# Patient Record
Sex: FEMALE | Race: WHITE
[De-identification: names, ages, dates, MRNs, and addresses within clinical notes are randomized per-mention and may not be internally consistent; named-entity substitution may affect disease eponyms.]

---

## 2018-02-28 ENCOUNTER — HOSPITAL ENCOUNTER (OUTPATIENT)
Age: 53
End: 2018-02-28
Payer: MEDICARE

## 2018-02-28 DIAGNOSIS — M25.562: Primary | ICD-10-CM

## 2018-02-28 DIAGNOSIS — Z98.1: ICD-10-CM

## 2018-02-28 DIAGNOSIS — M25.561: ICD-10-CM

## 2018-02-28 PROCEDURE — 73564 X-RAY EXAM KNEE 4 OR MORE: CPT

## 2018-06-11 ENCOUNTER — HOSPITAL ENCOUNTER (OUTPATIENT)
Age: 53
End: 2018-06-11
Payer: MEDICARE

## 2018-06-11 DIAGNOSIS — Z12.31: Primary | ICD-10-CM

## 2018-06-11 PROCEDURE — 77067 SCR MAMMO BI INCL CAD: CPT

## 2018-12-11 ENCOUNTER — HOSPITAL ENCOUNTER (OUTPATIENT)
Dept: HOSPITAL 22 - PT | Age: 53
Discharge: HOME | End: 2018-12-11
Payer: MEDICARE

## 2018-12-11 DIAGNOSIS — M25.561: Primary | ICD-10-CM

## 2018-12-11 PROCEDURE — 97163 PT EVAL HIGH COMPLEX 45 MIN: CPT

## 2018-12-11 PROCEDURE — 97014 ELECTRIC STIMULATION THERAPY: CPT

## 2018-12-11 PROCEDURE — G0283 ELEC STIM OTHER THAN WOUND: HCPCS

## 2018-12-11 PROCEDURE — 97016 VASOPNEUMATIC DEVICE THERAPY: CPT

## 2018-12-11 PROCEDURE — 97110 THERAPEUTIC EXERCISES: CPT

## 2018-12-11 PROCEDURE — 97010 HOT OR COLD PACKS THERAPY: CPT

## 2019-04-29 ENCOUNTER — OFFICE VISIT (OUTPATIENT)
Dept: ORTHOPEDIC SURGERY | Facility: CLINIC | Age: 54
End: 2019-04-29

## 2019-04-29 VITALS — HEART RATE: 127 BPM | WEIGHT: 200.62 LBS | BODY MASS INDEX: 34.25 KG/M2 | OXYGEN SATURATION: 98 % | HEIGHT: 64 IN

## 2019-04-29 DIAGNOSIS — M75.91 SUPRASPINATUS TENDINITIS, RIGHT: ICD-10-CM

## 2019-04-29 DIAGNOSIS — M25.511 RIGHT SHOULDER PAIN, UNSPECIFIED CHRONICITY: Primary | ICD-10-CM

## 2019-04-29 PROCEDURE — 99204 OFFICE O/P NEW MOD 45 MIN: CPT | Performed by: ORTHOPAEDIC SURGERY

## 2019-04-29 RX ORDER — OXYCODONE HYDROCHLORIDE AND ACETAMINOPHEN 5; 325 MG/1; MG/1
1 TABLET ORAL 3 TIMES DAILY
COMMUNITY

## 2019-04-29 RX ORDER — MELOXICAM 7.5 MG/1
7.5 TABLET ORAL 2 TIMES DAILY
COMMUNITY

## 2019-04-29 RX ORDER — TIZANIDINE 4 MG/1
4 TABLET ORAL 3 TIMES DAILY
COMMUNITY

## 2019-04-29 RX ORDER — GABAPENTIN 600 MG/1
600 TABLET ORAL 3 TIMES DAILY
COMMUNITY

## 2019-04-29 RX ORDER — LEVOTHYROXINE SODIUM 0.05 MG/1
50 TABLET ORAL DAILY
COMMUNITY

## 2019-04-29 NOTE — PROGRESS NOTES
[facility] [place] [facility] Note    Subjective     Chief Complaint   Patient presents with   • Right Shoulder - Pain        HPI      Davida BRUCE is a 53 y.o. female.  Pain is worse than right shoulder pain.  She had surgery 4 years ago.  She had a rotator cuff repair.  She is on disability.  She takes Percocet for back and shoulder.  Pain ranges from 4-8 out of 10.  It is aching burning throbbing.  She has stiffness and weakness.        Past Medical History:   Diagnosis Date   • Arthritis of back    • Knee swelling    • Periarthritis of shoulder    • Thyroid trouble       Past Surgical History:   Procedure Laterality Date   • BACK SURGERY  2001    L4-L5-S1   • CHOLECYSTECTOMY     • OOPHORECTOMY Right    • SHOULDER SURGERY Right     MAKEDA Damon   • TUBAL ABDOMINAL LIGATION        Family History   Problem Relation Age of Onset   • Osteoarthritis Mother    • Heart attack Father      Social History     Socioeconomic History   • Marital status:      Spouse name: Not on file   • Number of children: Not on file   • Years of education: Not on file   • Highest education level: Not on file   Tobacco Use   • Smoking status: Current Every Day Smoker     Packs/day: 1.00     Types: Cigarettes     Start date: 1985   • Smokeless tobacco: Never Used   Substance and Sexual Activity   • Alcohol use: No     Frequency: Never   • Drug use: No   • Sexual activity: Defer      Current Outpatient Medications on File Prior to Visit   Medication Sig Dispense Refill   • diphenhydrAMINE HCl (ALLERGY MED PO) Take  by mouth Daily.     • gabapentin (NEURONTIN) 600 MG tablet Take 600 mg by mouth 3 (Three) Times a Day.     • levothyroxine (SYNTHROID, LEVOTHROID) 50 MCG tablet Take 50 mcg by mouth Daily.     • meloxicam (MOBIC) 7.5 MG tablet Take 7.5 mg by mouth 2 (Two) Times a Day.     • oxyCODONE-acetaminophen (PERCOCET) 5-325 MG per tablet Take 1 tablet by mouth 3 (Three) Times a Day.     • tiZANidine (ZANAFLEX) 4 MG tablet Take 4  "mg by mouth 3 (Three) Times a Day.       No current facility-administered medications on file prior to visit.       Allergies   Allergen Reactions   • Penicillins Anaphylaxis        The following portions of the patient's history were reviewed and updated as appropriate: allergies, current medications, past family history, past medical history, past social history, past surgical history and problem list.    Review of Systems   Constitutional: Positive for activity change.        Night sweats   HENT: Negative.    Eyes: Negative.    Respiratory: Negative.    Cardiovascular: Negative.    Gastrointestinal: Negative.    Endocrine: Negative.    Genitourinary: Negative.    Musculoskeletal: Positive for arthralgias.   Skin: Negative.    Allergic/Immunologic: Positive for food allergies.   Neurological: Negative.    Hematological: Negative.    Psychiatric/Behavioral: Negative.         Objective      Physical Exam  Pulse (!) 127   Ht 162.6 cm (64\")   Wt 91 kg (200 lb 9.9 oz)   SpO2 98%   BMI 34.44 kg/m²     Body mass index is 34.44 kg/m².        GENERAL APPEARANCE: awake, alert & oriented x 3, in no acute distress and well developed, well nourished  PSYCH: normal mood and affect  LUNGS:  breathing nonlabored, no wheezing  EYES: sclera anicteric, pupils equal  CARDIOVASCULAR: palpable pulses dorsalis pedis, palpable posterior tibial bilaterally. Capillary refill less than 2 seconds  INTEGUMENTARY: skin intact, no clubbing, cyanosis  NEUROLOGIC:  Normal Sensation and reflexes             Ortho Exam  Musculoskeletal   Upper Extremity   Right Shoulder     Inspection and Palpation:     Tenderness - none    Crepitus - none    Sensation is normal    Examination reveals no ecchymosis.      Strength and Tone:    Supraspinatus,  Infraspinatus - 4/5    Subscapularis - 5/5    Deltoid - 5/5     Range of Motion   Left shoulder:    Internal Rotation: ROM - T7    External Rotation: AROM - 80 degrees    Elevation through flexion: AROM - " 180 degrees    Right Shoulder:    Internal Rotation: ROM - T7    External Rotation: AROM - 80 degrees    Elevation through flexion: AROM - 100 degrees     Abduction -100 degrees     Instability   Right shoulder    Sulcus sign negative    Apprehension test negative    Dangelo relocation test negative    Jerk test negative   Impingement   Right shoulder    Gill impingement test positive    Neer impingement test positive   Functional Testing   Right shoulder    AC crossover adduction test negative    Abdominal compression test negative    Lift-off sign negative    Speed's test negative    Deluna's test negative    Horriblower's sign negative       Imaging/Studies  Imaging Results (last 7 days)     Procedure Component Value Units Date/Time    XR Shoulder 2+ View Right [[id number]] Resulted:  04/29/19 1420     Updated:  04/29/19 1420    Narrative:       Right Shoulder X-Ray  Indication: Pain  AP, scapular Y, and axillary lateral views    Findings:  No fracture  No bony lesion  Normal soft tissues  Normal joint spaces    No prior studies were available for comparison.            Assessment/Plan        ICD-10-CM ICD-9-CM   1. Right shoulder pain, unspecified chronicity M25.511 719.41   2. Supraspinatus tendinitis, right M75.91 726.10       Orders Placed This Encounter   Procedures   • XR Shoulder 2+ View Right   • MRI Shoulder Right Without Contrast      I have ordered an MRI of the right shoulder.  She will continue the Mobic for the shoulder.  I will see her back after the MRI.  I am ordering an MRI to rule out a rotator cuff tear  Medical Decision Making  Management Options : over-the-counter medicine and prescription/IM medicine  Data/Risk: radiology tests and independent visualization of imaging, lab tests, or EMG/NCV    Marc Damon MD  04/29/19  2:25 PM         EMR Dragon/Transcription disclaimer:  Much of this encounter note is an electronic transcription of spoken language to printed text. Electronic  transcription of spoken language may permit erroneous, or at times, nonsensical words or phrases to be inadvertently transcribed. Although I have reviewed the note for such errors, some may still exist.

## 2019-05-13 ENCOUNTER — HOSPITAL ENCOUNTER (OUTPATIENT)
Age: 54
End: 2019-05-13
Payer: MEDICARE

## 2019-05-13 DIAGNOSIS — M75.91: ICD-10-CM

## 2019-05-13 DIAGNOSIS — M25.511: Primary | ICD-10-CM

## 2019-05-13 PROCEDURE — 73221 MRI JOINT UPR EXTREM W/O DYE: CPT

## 2019-05-15 ENCOUNTER — OFFICE VISIT (OUTPATIENT)
Dept: ORTHOPEDIC SURGERY | Facility: CLINIC | Age: 54
End: 2019-05-15

## 2019-05-15 VITALS — WEIGHT: 200.62 LBS | HEART RATE: 97 BPM | BODY MASS INDEX: 34.25 KG/M2 | OXYGEN SATURATION: 99 % | HEIGHT: 64 IN

## 2019-05-15 DIAGNOSIS — M19.011 PRIMARY LOCALIZED OSTEOARTHROSIS OF RIGHT SHOULDER REGION: ICD-10-CM

## 2019-05-15 DIAGNOSIS — F17.200 SMOKER UNMOTIVATED TO QUIT: ICD-10-CM

## 2019-05-15 DIAGNOSIS — S43.431D SUPERIOR GLENOID LABRUM LESION OF RIGHT SHOULDER, SUBSEQUENT ENCOUNTER: ICD-10-CM

## 2019-05-15 DIAGNOSIS — S46.011D RUPTURE OF RIGHT SUPRASPINATUS TENDON, SUBSEQUENT ENCOUNTER: Primary | ICD-10-CM

## 2019-05-15 PROCEDURE — 99214 OFFICE O/P EST MOD 30 MIN: CPT | Performed by: ORTHOPAEDIC SURGERY

## 2019-05-15 PROCEDURE — 99406 BEHAV CHNG SMOKING 3-10 MIN: CPT | Performed by: ORTHOPAEDIC SURGERY

## 2019-05-15 NOTE — PROGRESS NOTES
[facility] [place] [facility]opa[facility] Note    Subjective     Chief Complaint   Patient presents with   • Follow-up     Post MRI - Right shoulder pain, unspecified chronicity        HPI      Davida BRUCE is a 53 y.o. female.  She had a rotator cuff repair about 4 years ago and was doing well until 3 to 4 months ago and started to have increasing pain.  She is here for follow-up of the MRI.  The pain is 7 out of 10 it is aching burning stabbing shooting.  She has pain popping stiffness and giving way.  She is on chronic disability for her back.  She has not worked since .  She is a smoker unmotivated to quit.      Past Medical History:   Diagnosis Date   • Arthritis of back    • Knee swelling    • Periarthritis of shoulder    • Thyroid trouble       Past Surgical History:   Procedure Laterality Date   • BACK SURGERY      L4-L5-S1   • CHOLECYSTECTOMY     • OOPHORECTOMY Right    • SHOULDER SURGERY Right     MAKEDA Damon   • TUBAL ABDOMINAL LIGATION        Family History   Problem Relation Age of Onset   • Osteoarthritis Mother    • Heart attack Father      Social History     Socioeconomic History   • Marital status:      Spouse name: Not on file   • Number of children: Not on file   • Years of education: Not on file   • Highest education level: Not on file   Tobacco Use   • Smoking status: Former Smoker     Packs/day: 1.00     Types: Cigarettes     Start date:      Last attempt to quit: 2019     Years since quittin.0   • Smokeless tobacco: Never Used   Substance and Sexual Activity   • Alcohol use: No     Frequency: Never   • Drug use: No   • Sexual activity: Defer      Current Outpatient Medications on File Prior to Visit   Medication Sig Dispense Refill   • diphenhydrAMINE HCl (ALLERGY MED PO) Take  by mouth Daily.     • gabapentin (NEURONTIN) 600 MG tablet Take 600 mg by mouth 3 (Three) Times a Day.     • levothyroxine (SYNTHROID, LEVOTHROID) 50 MCG tablet Take 50 mcg by mouth Daily.     •  "meloxicam (MOBIC) 7.5 MG tablet Take 7.5 mg by mouth 2 (Two) Times a Day.     • oxyCODONE-acetaminophen (PERCOCET) 5-325 MG per tablet Take 1 tablet by mouth 3 (Three) Times a Day.     • tiZANidine (ZANAFLEX) 4 MG tablet Take 4 mg by mouth 3 (Three) Times a Day.       No current facility-administered medications on file prior to visit.       Allergies   Allergen Reactions   • Penicillins Anaphylaxis        The following portions of the patient's history were reviewed and updated as appropriate: allergies, current medications, past family history, past medical history, past social history, past surgical history and problem list.    Review of Systems   Constitutional: Negative.    HENT: Negative.    Eyes: Negative.    Respiratory: Negative.    Cardiovascular: Negative.    Gastrointestinal: Negative.    Endocrine: Negative.    Genitourinary: Positive for frequency.   Musculoskeletal: Positive for joint swelling.   Skin: Negative.    Allergic/Immunologic: Positive for environmental allergies and food allergies.   Neurological: Negative.    Hematological: Negative.    Psychiatric/Behavioral: Negative.         Objective      Physical Exam  Pulse 97   Ht 162.6 cm (64.02\")   Wt 91 kg (200 lb 9.9 oz)   SpO2 99%   Breastfeeding? No   BMI 34.42 kg/m²     Body mass index is 34.42 kg/m².        GENERAL APPEARANCE: awake, alert & oriented x 3, in no acute distress and well developed, well nourished  PSYCH: normal mood and affect      Ortho Exam  Musculoskeletal   Upper Extremity   Right Shoulder     Inspection and Palpation:     Tenderness - none    Crepitus - none    Sensation is normal    Examination reveals no ecchymosis.      Strength and Tone:    Supraspinatus,  Infraspinatus - 4/5    Subscapularis - 5/5    Deltoid - 5/5     Range of Motion   Left shoulder:    Internal Rotation: ROM - T7    External Rotation: AROM - 80 degrees    Elevation through flexion: AROM - 180 degrees    Right Shoulder:    Internal Rotation: ROM " - T7    External Rotation: AROM - 80 degrees    Elevation through flexion: AROM - 90 degrees     Abduction -90 degrees     Instability   Right shoulder    Sulcus sign negative    Apprehension test negative    Dangelo relocation test negative    Jerk test negative   Impingement   Right shoulder    Gill impingement test positive    Neer impingement test positive   Functional Testing   Right shoulder    AC crossover adduction test negative    Abdominal compression test negative    Lift-off sign negative    Speed's test negative    Deluna's test negative    Horriblower's sign negative       Imaging/Studies  Imaging Results (last 7 days)     ** No results found for the last 168 hours. **      I viewed her MRI from May 13 which shows a massive retracted rotator cuff tear with humeral head elevation and SLAP tear with some arthritic changes    Assessment/Plan        ICD-10-CM ICD-9-CM   1. Rupture of right supraspinatus tendon, subsequent encounter S46.011D V58.89     840.6   2. Superior glenoid labrum lesion of right shoulder, subsequent encounter S43.431D V58.89     840.7   3. Smoker unmotivated to quit F17.200 305.1   4. Primary localized osteoarthrosis of right shoulder region M19.011 715.11     She has a massive retracted cuff tear.  The plan will be for right shoulder arthroscopy with attempted revision rotator cuff repair.  The labrum will be addressed at surgery with a possible biceps tenodesis.  The arthritis will be treated nonoperatively with anti-inflammatories.  Based upon her smoking she has a high failure rate.  I counseled her to stop smoking. I spent 3 minutes with the patient discussing smoking cessation. I spent approximately 3 minutes counseling the patient regarding the adverse effects of tobacco use.  Included in this counseling session were treatment options for cessation including quitting cold turkey, medication, nicotine step-down programs, and smoking cessation programs.  Furthermore, I explained  to the patient the importance of abstaining from nicotine usage as nicotine is known to increase the risk of complications associated with surgery including but not limited to infection, decreased wound healing, slowed soft tissue healing, and increased surgery associated pain. I explained the risk of smoking, including hardening of the arteries,  gangrene, amputation.  I explained smoking poisons bone cells and increases the risk of nonunion of fractures and fusions.  I explained that smokers are also an increased risk of tendinitis and it is more difficult to treat and resolve. Treatment options and alternatives were discussed with patient.  Surgical risks include but are not limited to pain, bleeding, infection, failure to relieve symptoms, need for further procedures, recurrence of symptoms, damage to healthy adjacent structures, hardware loosening/failure, stiffness, weakness, scar, blood clots/DVT/PE, loss of limb or life. We also discussed the postoperative protocol and expected outcome although no guarantees are possible with surgery. All questions were answered; the patient would like to proceed with surgical intervention.  Medical Decision Making  Management Options : over-the-counter medicine and major surgery with risk factors  Data/Risk: radiology tests and independent visualization of imaging, lab tests, or EMG/NCV    Marc Damon MD  05/15/19  2:16 PM         EMR Dragon/Transcription disclaimer:  Much of this encounter note is an electronic transcription of spoken language to printed text. Electronic transcription of spoken language may permit erroneous, or at times, nonsensical words or phrases to be inadvertently transcribed. Although I have reviewed the note for such errors, some may still exist.

## 2019-05-20 DIAGNOSIS — M25.511 RIGHT SHOULDER PAIN, UNSPECIFIED CHRONICITY: ICD-10-CM

## 2019-05-20 DIAGNOSIS — M75.91 SUPRASPINATUS TENDINITIS, RIGHT: ICD-10-CM

## 2019-05-31 ENCOUNTER — TELEPHONE (OUTPATIENT)
Dept: ORTHOPEDIC SURGERY | Facility: CLINIC | Age: 54
End: 2019-05-31

## 2019-05-31 NOTE — TELEPHONE ENCOUNTER
RETURNED PATIENT'S CALL REGARDING MEDICATIONS PRIOR TO SURGERY WITH DR JIMENES.  SHE DID NOT ANSWER, I LEFT MESSAGE TO RETURN MY CALL.

## 2019-06-04 ENCOUNTER — OUTSIDE FACILITY SERVICE (OUTPATIENT)
Dept: ORTHOPEDIC SURGERY | Facility: CLINIC | Age: 54
End: 2019-06-04

## 2019-06-04 PROCEDURE — 29827 SHO ARTHRS SRG RT8TR CUF RPR: CPT | Performed by: ORTHOPAEDIC SURGERY

## 2019-06-04 PROCEDURE — 29826 SHO ARTHRS SRG DECOMPRESSION: CPT | Performed by: ORTHOPAEDIC SURGERY

## 2019-06-10 ENCOUNTER — OFFICE VISIT (OUTPATIENT)
Dept: ORTHOPEDIC SURGERY | Facility: CLINIC | Age: 54
End: 2019-06-10

## 2019-06-10 DIAGNOSIS — Z98.890 STATUS POST RIGHT ROTATOR CUFF REPAIR: Primary | ICD-10-CM

## 2019-06-10 PROCEDURE — 99024 POSTOP FOLLOW-UP VISIT: CPT | Performed by: ORTHOPAEDIC SURGERY

## 2019-06-10 RX ORDER — PANTOPRAZOLE SODIUM 40 MG/1
TABLET, DELAYED RELEASE ORAL
COMMUNITY
Start: 2019-05-13

## 2019-06-10 NOTE — PROGRESS NOTES
Chief Complaint   Patient presents with   • Right Shoulder - Follow-up, Post-op     Status post right shoulder arthroscopy with rotator cuff repair 6/4/19           HPI    She is doing well 1 week status post right shoulder cuff repair.  Pain is 0.  She is in a sling.    There were no vitals filed for this visit.      Physical Exam:    Wounds look good.  Neurovascular intact.        ICD-10-CM ICD-9-CM   1. Status post right rotator cuff repair Z98.890 V45.89     She will follow-up in 3 weeks to start physical therapy.  She will continue the sling.

## 2019-07-01 ENCOUNTER — OFFICE VISIT (OUTPATIENT)
Dept: ORTHOPEDIC SURGERY | Facility: CLINIC | Age: 54
End: 2019-07-01

## 2019-07-01 DIAGNOSIS — F17.200 SMOKER UNMOTIVATED TO QUIT: ICD-10-CM

## 2019-07-01 DIAGNOSIS — Z98.890 STATUS POST RIGHT ROTATOR CUFF REPAIR: Primary | ICD-10-CM

## 2019-07-01 PROCEDURE — 99024 POSTOP FOLLOW-UP VISIT: CPT | Performed by: ORTHOPAEDIC SURGERY

## 2019-07-01 RX ORDER — IBUPROFEN 400 MG/1
400 TABLET ORAL EVERY 4 HOURS
COMMUNITY

## 2019-07-01 NOTE — PROGRESS NOTES
Chief Complaint   Patient presents with   • Right Shoulder - Post-op     4 weeks Status post right shoulder arthroscopy with rotator cuff repair 6/4/19           HPI  She is follow-up right shoulder cuff repair from a month ago.  She is doing well.      There were no vitals filed for this visit.      Physical Exam:  Portals look good.  She is neurovascular intact.        ICD-10-CM ICD-9-CM   1. Status post right rotator cuff repair Z98.890 V45.89   2. Smoker unmotivated to quit F17.200 305.1       Orders Placed This Encounter   Procedures   • Ambulatory Referral to Physical Therapy     To start physical therapy and follow up in 1 month.  She is disabled.

## 2019-07-08 ENCOUNTER — TELEPHONE (OUTPATIENT)
Dept: ORTHOPEDIC SURGERY | Facility: CLINIC | Age: 54
End: 2019-07-08

## 2019-07-08 NOTE — TELEPHONE ENCOUNTER
THE PHYSICAL THERAPY FACILITY CALLED WANTING TO SEE IF THERE WAS ANY WAY THE ORDER THAT WAS PLACED ON 7/1/2019 FOR PHYSICAL THERAPY CAN BE CHANGED TO SAY OCCUPATIONAL THERAPY. IF THIS IS OKAY IT CAN BE FAXED TO [facility] [phone].

## 2019-07-09 DIAGNOSIS — Z98.890 STATUS POST RIGHT ROTATOR CUFF REPAIR: Primary | ICD-10-CM

## 2019-08-05 ENCOUNTER — OFFICE VISIT (OUTPATIENT)
Dept: ORTHOPEDIC SURGERY | Facility: CLINIC | Age: 54
End: 2019-08-05

## 2019-08-05 ENCOUNTER — HOSPITAL ENCOUNTER (OUTPATIENT)
Dept: HOSPITAL 22 - OT | Age: 54
Discharge: HOME | End: 2019-08-05
Payer: MEDICARE

## 2019-08-05 DIAGNOSIS — M25.511: ICD-10-CM

## 2019-08-05 DIAGNOSIS — F17.200 SMOKER UNMOTIVATED TO QUIT: ICD-10-CM

## 2019-08-05 DIAGNOSIS — Z98.890: Primary | ICD-10-CM

## 2019-08-05 DIAGNOSIS — Z98.890 STATUS POST RIGHT ROTATOR CUFF REPAIR: Primary | ICD-10-CM

## 2019-08-05 PROCEDURE — 97165 OT EVAL LOW COMPLEX 30 MIN: CPT

## 2019-08-05 PROCEDURE — 97140 MANUAL THERAPY 1/> REGIONS: CPT

## 2019-08-05 PROCEDURE — 99024 POSTOP FOLLOW-UP VISIT: CPT | Performed by: ORTHOPAEDIC SURGERY

## 2019-08-05 PROCEDURE — 97014 ELECTRIC STIMULATION THERAPY: CPT

## 2019-08-05 PROCEDURE — 97110 THERAPEUTIC EXERCISES: CPT

## 2019-08-05 PROCEDURE — G0283 ELEC STIM OTHER THAN WOUND: HCPCS

## 2019-08-05 NOTE — PROGRESS NOTES
Chief Complaint   Patient presents with   • Post-op     2 month Status post right shoulder arthroscopy with rotator cuff repair 6/4/19           HPI    Is 2 months out from right shoulder rotator cuff repair doing better.  No pain.    There were no vitals filed for this visit.      Physical Exam:    He has full baseline motion.  4 out of 5 strength.      ICD-10-CM ICD-9-CM   1. Status post right rotator cuff repair Z98.890 V45.89   2. Smoker unmotivated to quit F17.200 305.1       Orders Placed This Encounter   Procedures   • Ambulatory Referral to Physical Therapy     She will continue physical therapy and follow-up in 1 month.  No strengthening until 3 months postop

## 2020-05-04 ENCOUNTER — HOSPITAL ENCOUNTER (OUTPATIENT)
Age: 55
End: 2020-05-04
Payer: MEDICARE

## 2020-05-04 DIAGNOSIS — R00.2: Primary | ICD-10-CM

## 2020-05-04 PROCEDURE — 93270 REMOTE 30 DAY ECG REV/REPORT: CPT

## 2020-06-08 ENCOUNTER — HOSPITAL ENCOUNTER (OUTPATIENT)
Dept: HOSPITAL 22 - PT | Age: 55
Discharge: HOME | End: 2020-06-08
Payer: MEDICARE

## 2020-06-08 DIAGNOSIS — M25.561: Primary | ICD-10-CM

## 2020-06-08 DIAGNOSIS — M17.11: ICD-10-CM

## 2020-06-08 PROCEDURE — 97110 THERAPEUTIC EXERCISES: CPT

## 2020-06-08 PROCEDURE — G0283 ELEC STIM OTHER THAN WOUND: HCPCS

## 2020-06-08 PROCEDURE — 97163 PT EVAL HIGH COMPLEX 45 MIN: CPT

## 2020-06-08 PROCEDURE — 97014 ELECTRIC STIMULATION THERAPY: CPT

## 2020-06-08 PROCEDURE — 97010 HOT OR COLD PACKS THERAPY: CPT

## 2020-06-08 PROCEDURE — 97035 APP MDLTY 1+ULTRASOUND EA 15: CPT

## 2020-06-08 PROCEDURE — 97033 APP MDLTY 1+IONTPHRSIS EA 15: CPT

## 2020-10-03 ENCOUNTER — HOSPITAL ENCOUNTER (OUTPATIENT)
Age: 55
End: 2020-10-03
Payer: MEDICARE

## 2020-10-03 DIAGNOSIS — Z13.810: ICD-10-CM

## 2020-10-03 DIAGNOSIS — Z01.89: Primary | ICD-10-CM

## 2020-10-03 PROCEDURE — 86328 IA NFCT AB SARSCOV2 COVID19: CPT

## 2020-10-03 PROCEDURE — 36415 COLL VENOUS BLD VENIPUNCTURE: CPT

## 2020-10-05 ENCOUNTER — ON CAMPUS - OUTPATIENT (OUTPATIENT)
Dept: RURAL HOSPITAL 6 | Facility: HOSPITAL | Age: 55
End: 2020-10-05
Payer: COMMERCIAL

## 2020-10-05 ENCOUNTER — HOSPITAL ENCOUNTER (OUTPATIENT)
Dept: HOSPITAL 22 - OUTP | Age: 55
Discharge: HOME | End: 2020-10-05
Payer: MEDICARE

## 2020-10-05 VITALS
SYSTOLIC BLOOD PRESSURE: 126 MMHG | HEART RATE: 96 BPM | OXYGEN SATURATION: 96 % | TEMPERATURE: 97.1 F | DIASTOLIC BLOOD PRESSURE: 82 MMHG | RESPIRATION RATE: 18 BRPM

## 2020-10-05 VITALS
OXYGEN SATURATION: 95 % | SYSTOLIC BLOOD PRESSURE: 137 MMHG | DIASTOLIC BLOOD PRESSURE: 84 MMHG | TEMPERATURE: 97.1 F | HEART RATE: 69 BPM | RESPIRATION RATE: 18 BRPM

## 2020-10-05 VITALS
RESPIRATION RATE: 18 BRPM | OXYGEN SATURATION: 99 % | HEART RATE: 93 BPM | DIASTOLIC BLOOD PRESSURE: 83 MMHG | SYSTOLIC BLOOD PRESSURE: 129 MMHG | TEMPERATURE: 98.6 F

## 2020-10-05 VITALS
SYSTOLIC BLOOD PRESSURE: 114 MMHG | DIASTOLIC BLOOD PRESSURE: 78 MMHG | RESPIRATION RATE: 18 BRPM | OXYGEN SATURATION: 93 % | TEMPERATURE: 97.1 F | HEART RATE: 91 BPM

## 2020-10-05 VITALS
HEART RATE: 91 BPM | TEMPERATURE: 97.16 F | OXYGEN SATURATION: 96 % | RESPIRATION RATE: 18 BRPM | SYSTOLIC BLOOD PRESSURE: 153 MMHG | DIASTOLIC BLOOD PRESSURE: 85 MMHG

## 2020-10-05 VITALS
OXYGEN SATURATION: 95 % | TEMPERATURE: 97.1 F | RESPIRATION RATE: 18 BRPM | SYSTOLIC BLOOD PRESSURE: 159 MMHG | HEART RATE: 90 BPM | DIASTOLIC BLOOD PRESSURE: 93 MMHG

## 2020-10-05 VITALS — BODY MASS INDEX: 33.7 KG/M2

## 2020-10-05 VITALS — OXYGEN SATURATION: 100 %

## 2020-10-05 DIAGNOSIS — K30 FUNCTIONAL DYSPEPSIA: ICD-10-CM

## 2020-10-05 DIAGNOSIS — Z82.3: ICD-10-CM

## 2020-10-05 DIAGNOSIS — K22.4: ICD-10-CM

## 2020-10-05 DIAGNOSIS — F32.9: ICD-10-CM

## 2020-10-05 DIAGNOSIS — R13.10 DYSPHAGIA, UNSPECIFIED: ICD-10-CM

## 2020-10-05 DIAGNOSIS — R12 HEARTBURN: ICD-10-CM

## 2020-10-05 DIAGNOSIS — Z98.51: ICD-10-CM

## 2020-10-05 DIAGNOSIS — E03.9: ICD-10-CM

## 2020-10-05 DIAGNOSIS — I49.9: ICD-10-CM

## 2020-10-05 DIAGNOSIS — R10.13 EPIGASTRIC PAIN: ICD-10-CM

## 2020-10-05 DIAGNOSIS — K29.50 UNSPECIFIED CHRONIC GASTRITIS WITHOUT BLEEDING: ICD-10-CM

## 2020-10-05 DIAGNOSIS — K44.9: ICD-10-CM

## 2020-10-05 DIAGNOSIS — K21.9: Primary | ICD-10-CM

## 2020-10-05 DIAGNOSIS — K82.9: ICD-10-CM

## 2020-10-05 DIAGNOSIS — K22.4 DYSKINESIA OF ESOPHAGUS: ICD-10-CM

## 2020-10-05 DIAGNOSIS — K31.9: ICD-10-CM

## 2020-10-05 DIAGNOSIS — J45.909: ICD-10-CM

## 2020-10-05 DIAGNOSIS — K29.50: ICD-10-CM

## 2020-10-05 DIAGNOSIS — K21.9 GASTRO-ESOPHAGEAL REFLUX DISEASE WITHOUT ESOPHAGITIS: ICD-10-CM

## 2020-10-05 PROCEDURE — 88305 TISSUE EXAM BY PATHOLOGIST: CPT

## 2020-10-05 PROCEDURE — C1726 CATH, BAL DIL, NON-VASCULAR: HCPCS

## 2020-10-05 PROCEDURE — 43239 EGD BIOPSY SINGLE/MULTIPLE: CPT | Performed by: INTERNAL MEDICINE

## 2020-10-05 PROCEDURE — 88342 IMHCHEM/IMCYTCHM 1ST ANTB: CPT

## 2020-10-05 PROCEDURE — 43239 EGD BIOPSY SINGLE/MULTIPLE: CPT

## 2020-10-05 PROCEDURE — 0DJ08ZZ INSPECTION OF UPPER INTESTINAL TRACT, VIA NATURAL OR ARTIFICIAL OPENING ENDOSCOPIC: ICD-10-PCS | Performed by: INTERNAL MEDICINE

## 2020-10-05 PROCEDURE — 43249 ESOPH EGD DILATION <30 MM: CPT

## 2020-10-05 PROCEDURE — 43249 ESOPH EGD DILATION <30 MM: CPT | Performed by: INTERNAL MEDICINE

## 2020-11-04 ENCOUNTER — TRANSCRIBE ORDERS (OUTPATIENT)
Dept: NUTRITION | Facility: HOSPITAL | Age: 55
End: 2020-11-04

## 2020-11-04 DIAGNOSIS — E74.31 SUCRASE-ISOMALTASE DEFICIENCY: Primary | ICD-10-CM

## 2020-11-24 ENCOUNTER — HOSPITAL ENCOUNTER (OUTPATIENT)
Dept: NUTRITION | Facility: HOSPITAL | Age: 55
Setting detail: RECURRING SERIES
Discharge: HOME | End: 2020-11-24

## 2020-11-24 VITALS — BODY MASS INDEX: 33.5 KG/M2 | WEIGHT: 196.21 LBS | HEIGHT: 64 IN

## 2020-11-24 PROCEDURE — 97802 MEDICAL NUTRITION INDIV IN: CPT | Performed by: DIETITIAN, REGISTERED

## 2020-11-24 NOTE — CONSULTS
Adult Outpatient Nutrition  Assessment/PES    Patient Name:  Davida Whyte  YOB: 1965  MRN: [id number]    Assessment Date:  11/24/2020    Comments:  Telephone nutrition consult, 60 minutes. This medical referred consult was provided as a telephone call, tele-health or e-visit, as patient is unable to attend an in-office appointment due to the COVID-19 crisis. Consent for treatment was given verbally.    Patient presents for CSID/low sucrose diet education Per GI note, patient's sucrase digestion is 2.86% (NL>5.1%). Patient reports symptoms of abdominal pain and diarrhea which prompted her to see GI in March of this year. Since then she has had an endoscopy, after which she was placed on buspar and was instructed to follow a soft fiber diet. Patient states that she completed the sucrose breath test about 1 month ago and after diagnosis began taking Sucraid (about 2 weeks ago). States that Sucraid has helped tremendously, especially with abdominal pain, but she is still having diarrhea following meals. Patient does state that diarrhea is improved from what it was when she first sought treatment but is still happening regularly.     During the session we discussed the CISD diagnosis, as well as the  low-sucrose diet as a means of identifying tolerated vs non-tolerated foods as well as tolerated dose. This is typically done in two stages, an elimination state (during which all starches and sugars are eliminated from the diet) and a challenge phase (duing which individual foods are reintroduced at increasing doses to determine tolerance). Informed that goal is to balance symptom avoidance with QOL, which typically results in a combination of dietary modification as well as medication management. Patient verbalized understanding. We reviewed lists of foods to choose vs avoid during elimination phase. Patient seems content with lists to choose and does not feel as though the diet will be too challenging for  "her. RD addressed several questions regarding artificial sweeteners and spices to use. Patient demonstrated good comprehension, RD anticipates good adherence to recommendations.     Goals:  - low sucrose challenge phase x 2 weeks  - maintain weight vs. Healthful weight loss    Total of 60 minutes spent with patient on nutrition counseling. Education based on Academy of Dietetics and Nutrition guidelines. Patient was provided with RD's contact information. Follow up visit is scheduled for 12/11/20 at 12:30 pm. Thank you for this referral.      General Info     Row Name 11/24/20 1233       Today's Session    Person(s) attending today's session  Patient     Services Used Today?  No       General Information    How Well Do You Speak English?  very well    Do You Speak a Language Other Than English at Home?  no    Are you able to read and write English?  Yes    Lives With  spouse    Is patient pregnant?  no       Relationship/Environment    Name(s) of Who Lives With Patient            Anthropometrics     Row Name 11/24/20 1237          Anthropometrics    Height  162.6 cm (64.02\")     Weight  89 kg (196 lb 3.4 oz)        Ideal Body Weight (IBW)    Ideal Body Weight (IBW) (kg)  55.04     % Ideal Body Weight  161.7        Body Mass Index (BMI)    BMI (kg/m2)  33.73         Nutritional Info/Activity     Row Name 11/24/20 1238       Nutritional Information    Have you had weight changes?  No    What is your desired body weight?  75 kg (165 lb 5.5 oz)    Have you tried to lose weight before?  No    Food Allergies  peanut/tree nut;other (see comments) CSID    Supplemental Drinks/Foods/Additives  probiotic, vit c, vit d 3, VMI    History of eating disorder?  No    What cultural diet influences are important for you to follow?  none    Do you have difficulty chewing food?  No    Functional Status  able to prepare meals;able to purchase food;ambulatory    List any food cravings/trigger foods you have  none    How " "often during the day do you find yourself snacking?  none    How often do you eat out and where?  rarely    Do you use Food Assistance programs (WIC, food stamps, food bank)?  yes    Do you need information about Food Assistance programs?  no    Enter everything you can remember eating in the last 24 hours (1 day)  Breakfast: frosted flakes with whole milk Lunch: hamburger with slice of cheese and bread, Dinner: hot dog with sauce, cheese and mac and cheese       Eating Environment    Eating environment  Family       Physical Activity    Are you currently involved in an activity/exercise program?   No    How many minutes do you spend on exercise each day?  10    How would you rank exercise as an important health lifestyle practice?  5        Home Nutrition Report     Row Name 11/24/20 1244 11/24/20 1238       Home Nutrition Report    Diet  No specific  --    Supplemental Drinks/Foods/Additives  --  probiotic, vit c, vit d 3, VMI    Factors Affecting Nutritional Intake  diarrhea  --        Estimated/Assessed Needs     Row Name 11/24/20 1237          Calculation Measurements    Weight Used For Calculations  89 kg (196 lb 3.4 oz)     Height  162.6 cm (64.02\")        Estimated/Assessed Needs    Additional Documentation  Costilla-St. Jeor Equation (Group)        Costilla-St. Jeor Equation    RMR (Costilla-St. Jeor Equation)  1470.254     Costilla-St. Jeor Activity Factors  1.2     Activity Factors (Costilla-St. Jeor)  9896.4506                 Problem/Interventions:  Problem 1     Row Name 11/24/20 1255          Nutrition Diagnoses Problem 1    Problem 1  Altered GI Function     Etiology (related to)  Medical Diagnosis     Gastrointestinal  Other (comment) CSID     Signs/Symptoms (evidenced by)  Report/Observation     Reported/Observed By  MD     Allergy/Food Intolerance  Other (comment) CSID                 Intervention Goal     Row Name 11/24/20 1257          Intervention Goal    General  Reduce/improve symptoms     PO  " Meet estimated needs     Weight  Appropriate weight loss           Nutrition Prescription     Row Name 11/24/20 1258          Nutrition Prescription PO    PO Prescription  Begin/change diet     Begin/Change Diet to  Regular     Fluid Consistency  Thin     Common Modifiers  Other (comment) low sucrose     New PO Prescription Ordered?  No, recommended         Education/Evaluation     Row Name 11/24/20 1925          Education    Education  Education topics;Provided education regarding     Provided education regarding  Avoidance/improvement of symptoms;Diet rationale     Education Topics  GI disease        Monitor/Evaluation    Monitor  Per protocol     Education Follow-up  Other (comment)           Electronically signed by:  Arely Rosenbaum RD  11/24/20 12:59 EST

## 2020-12-11 ENCOUNTER — HOSPITAL ENCOUNTER (OUTPATIENT)
Dept: NUTRITION | Facility: HOSPITAL | Age: 55
Setting detail: RECURRING SERIES
Discharge: HOME | End: 2020-12-11

## 2020-12-11 NOTE — PROGRESS NOTES
Adult Outpatient Nutrition  Assessment/PES    Patient Name:  Davida Whyte  YOB: 1965  MRN: [id number]    Assessment Date:  12/11/2020    Comments:  Telephone nutrition consult, 15 minutes. This medical referred consult was provided as a telephone call, tele-health or e-visit, as patient is unable to attend an in-office appointment due to the COVID-19 crisis. Consent for treatment was given verbally.    Patient presents for follow up of CSID management. Patient has been following low sucrose diet for 2 weeks and has noticed dramatic symptom improvement. She states that her abdominal pain is gone and that her diarrhea is mostly resolved. She is currently having 2-3 semi-formed stools per day. She reports occasional fecal urgency but not frequent.     Based on level of symptom improvement, RD would like for patient to continue on low-sucrose elimination phase x 1 week for more complete resolution. Patient is agreeable. We did discuss reintroduction phase, pending continued improvement over the next week.  RD advised patient to begin reintroducing previously excluded foods into diet, one at a time, starting with one new food every 3 days. RD advised to being with small amounts of new foods and slowly increase to determine tolerated dose. Advised to only proceed with testing new foods should challenged foods/dose is tolerated. If food/dose is not tolerated she can choose to 1) eliminate this food from diet and proceed with challenge once symptom free for 24 hours or 2) test this same food with the use of Sucraid to see if it is tolerated in this context. Patient verbalized understanding. Patient declined to schedule additional follow up at this time, but states that she will call should further nutrition questions arise.     Goal progression:  - low sucrose challenge phase x 2 weeks, 100% met  - maintain weight vs. Healthful weight loss, unsure     Total of 15 minutes spent with patient on nutrition  counseling. Education based on [employer] guidelines. Patient was provided with RD's contact information. Follow up prn. Thank you for this referral.    Electronically signed by:  Arely Rosenbaum RD  12/11/20 13:23 EST

## 2021-01-15 ENCOUNTER — HOSPITAL ENCOUNTER (OUTPATIENT)
Age: 56
End: 2021-01-15
Payer: MEDICARE

## 2021-01-15 DIAGNOSIS — B96.81: ICD-10-CM

## 2021-01-15 DIAGNOSIS — R10.9: Primary | ICD-10-CM

## 2021-01-15 PROCEDURE — 36415 COLL VENOUS BLD VENIPUNCTURE: CPT

## 2021-01-15 PROCEDURE — 83013 H PYLORI (C-13) BREATH: CPT

## 2021-01-18 ENCOUNTER — OFFICE (OUTPATIENT)
Dept: RURAL CLINIC 2 | Facility: CLINIC | Age: 56
End: 2021-01-18

## 2021-01-18 VITALS
WEIGHT: 199 LBS | SYSTOLIC BLOOD PRESSURE: 128 MMHG | DIASTOLIC BLOOD PRESSURE: 57 MMHG | TEMPERATURE: 96.8 F | HEIGHT: 64 IN

## 2021-01-18 DIAGNOSIS — E74.31 SUCRASE-ISOMALTASE DEFICIENCY: ICD-10-CM

## 2021-01-18 DIAGNOSIS — K30 FUNCTIONAL DYSPEPSIA: ICD-10-CM

## 2021-01-18 DIAGNOSIS — B96.81 HELICOBACTER PYLORI [H. PYLORI] AS THE CAUSE OF DISEASES CLA: ICD-10-CM

## 2021-01-18 DIAGNOSIS — R19.4 CHANGE IN BOWEL HABIT: ICD-10-CM

## 2021-01-18 PROCEDURE — 99214 OFFICE O/P EST MOD 30 MIN: CPT | Performed by: NURSE PRACTITIONER

## 2021-07-19 ENCOUNTER — OFFICE (OUTPATIENT)
Dept: RURAL CLINIC 2 | Facility: CLINIC | Age: 56
End: 2021-07-19

## 2021-07-19 VITALS — HEIGHT: 64 IN

## 2021-07-19 DIAGNOSIS — R19.4 CHANGE IN BOWEL HABIT: ICD-10-CM

## 2021-07-19 DIAGNOSIS — E74.31 SUCRASE-ISOMALTASE DEFICIENCY: ICD-10-CM

## 2021-07-19 DIAGNOSIS — K30 FUNCTIONAL DYSPEPSIA: ICD-10-CM

## 2021-07-19 PROCEDURE — 99214 OFFICE O/P EST MOD 30 MIN: CPT | Performed by: NURSE PRACTITIONER

## 2021-11-03 ENCOUNTER — HOSPITAL ENCOUNTER (OUTPATIENT)
Age: 56
End: 2021-11-03
Payer: MEDICARE

## 2021-11-03 DIAGNOSIS — E11.65: Primary | ICD-10-CM

## 2021-11-03 DIAGNOSIS — Z71.3: ICD-10-CM

## 2021-11-03 PROCEDURE — 97802 MEDICAL NUTRITION INDIV IN: CPT

## 2022-02-23 ENCOUNTER — HOSPITAL ENCOUNTER (OUTPATIENT)
Age: 57
End: 2022-02-23
Payer: MEDICARE

## 2022-02-23 DIAGNOSIS — G44.52: Primary | ICD-10-CM

## 2022-02-23 LAB
BUN SERPL-MCNC: 20 MG/DL (ref 7–17)
CREAT BLD-SCNC: 0.6 MG/DL (ref 0.52–1.04)
ESTIMATED GLOMERULAR FILT RATE: 103 ML/MIN (ref 60–?)
GFR (AFRICAN AMERICAN): 125 ML/MIN (ref 60–?)

## 2022-02-23 PROCEDURE — 82565 ASSAY OF CREATININE: CPT

## 2022-02-23 PROCEDURE — 84520 ASSAY OF UREA NITROGEN: CPT

## 2022-02-23 PROCEDURE — 36415 COLL VENOUS BLD VENIPUNCTURE: CPT

## 2022-02-24 ENCOUNTER — HOSPITAL ENCOUNTER (OUTPATIENT)
Age: 57
End: 2022-02-24
Payer: MEDICARE

## 2022-02-24 DIAGNOSIS — R51.9: Primary | ICD-10-CM

## 2022-02-24 DIAGNOSIS — R42: ICD-10-CM

## 2022-02-24 PROCEDURE — A9576 INJ PROHANCE MULTIPACK: HCPCS

## 2022-02-24 PROCEDURE — 70553 MRI BRAIN STEM W/O & W/DYE: CPT

## 2022-03-08 ENCOUNTER — HOSPITAL ENCOUNTER (OUTPATIENT)
Age: 57
End: 2022-03-08
Payer: MEDICARE

## 2022-03-08 DIAGNOSIS — M89.9: Primary | ICD-10-CM

## 2022-03-08 PROCEDURE — 70450 CT HEAD/BRAIN W/O DYE: CPT

## 2022-04-01 ENCOUNTER — HOSPITAL ENCOUNTER (OUTPATIENT)
Age: 57
End: 2022-04-01
Payer: MEDICARE

## 2022-04-01 DIAGNOSIS — Z79.891: Primary | ICD-10-CM

## 2022-04-01 DIAGNOSIS — Z51.81: ICD-10-CM

## 2022-04-01 PROCEDURE — 80305 DRUG TEST PRSMV DIR OPT OBS: CPT

## 2022-07-06 RX ORDER — ORAL SEMAGLUTIDE 7 MG/1
TABLET ORAL
Qty: 30 TABLET | Refills: 1 | Status: SHIPPED | OUTPATIENT
Start: 2022-07-06

## 2022-07-18 ENCOUNTER — OFFICE (OUTPATIENT)
Dept: URBAN - METROPOLITAN AREA CLINIC 4 | Facility: CLINIC | Age: 57
End: 2022-07-18

## 2022-07-18 VITALS — DIASTOLIC BLOOD PRESSURE: 82 MMHG | HEIGHT: 64 IN | SYSTOLIC BLOOD PRESSURE: 134 MMHG | WEIGHT: 171 LBS

## 2022-07-18 DIAGNOSIS — E74.31 SUCRASE-ISOMALTASE DEFICIENCY: ICD-10-CM

## 2022-07-18 DIAGNOSIS — K30 FUNCTIONAL DYSPEPSIA: ICD-10-CM

## 2022-07-18 PROCEDURE — 99214 OFFICE O/P EST MOD 30 MIN: CPT | Performed by: NURSE PRACTITIONER

## 2022-09-01 RX ORDER — BUSPIRONE HYDROCHLORIDE 10 MG/1
TABLET ORAL
Qty: 30 TABLET | Refills: 2 | OUTPATIENT
Start: 2022-09-01

## 2022-09-01 RX ORDER — GABAPENTIN 600 MG/1
TABLET ORAL
Qty: 90 TABLET | Refills: 0 | OUTPATIENT
Start: 2022-09-01

## 2022-10-06 ENCOUNTER — HOSPITAL ENCOUNTER (OUTPATIENT)
Age: 57
End: 2022-10-06
Payer: MEDICARE

## 2022-10-06 DIAGNOSIS — M25.572: Primary | ICD-10-CM

## 2022-10-06 PROCEDURE — 73610 X-RAY EXAM OF ANKLE: CPT

## 2023-01-03 ENCOUNTER — HOSPITAL ENCOUNTER (OUTPATIENT)
Age: 58
End: 2023-01-03
Payer: MEDICARE

## 2023-01-03 DIAGNOSIS — E78.5: ICD-10-CM

## 2023-01-03 DIAGNOSIS — M54.16: ICD-10-CM

## 2023-01-03 DIAGNOSIS — Z79.84: ICD-10-CM

## 2023-01-03 DIAGNOSIS — K21.9: ICD-10-CM

## 2023-01-03 DIAGNOSIS — E11.9: Primary | ICD-10-CM

## 2023-01-03 DIAGNOSIS — E03.9: ICD-10-CM

## 2023-01-03 LAB
ALBUMIN LEVEL: 4.2 G/DL (ref 3.5–5)
ALBUMIN/GLOB SERPL: 1.6 {RATIO} (ref 1.1–1.8)
ALP ISO SERPL-ACNC: 101 U/L (ref 38–126)
ALT SERPLBLD-CCNC: 21 U/L (ref 12–78)
ANION GAP SERPL CALC-SCNC: 12.4 MEQ/L (ref 5–15)
AST SERPL QL: 28 U/L (ref 14–36)
BILIRUBIN,TOTAL: 0.3 MG/DL (ref 0.2–1.3)
BUN SERPL-MCNC: 13 MG/DL (ref 7–17)
CALCIUM SPEC-MCNC: 8.9 MG/DL (ref 8.4–10.2)
CHLORIDE SPEC-SCNC: 105 MMOL/L (ref 98–107)
CHOLEST SPEC-SCNC: 176 MG/DL (ref 140–200)
CO2 SERPL-SCNC: 27 MMOL/L (ref 22–30)
CREAT BLD-SCNC: 0.5 MG/DL (ref 0.52–1.04)
ESTIMATED GLOMERULAR FILT RATE: 127 ML/MIN (ref 60–?)
GFR (AFRICAN AMERICAN): 154 ML/MIN (ref 60–?)
GLOBULIN SER CALC-MCNC: 2.6 G/DL (ref 1.3–3.2)
GLUCOSE: 63 MG/DL (ref 74–100)
HBA1C MFR BLD: 5.3 % (ref 4–6)
HCT VFR BLD CALC: 47.1 % (ref 37–47)
HDLC SERPL-MCNC: 47 MG/DL (ref 40–60)
HGB BLD-MCNC: 15.4 G/DL (ref 12.2–16.2)
MCHC RBC-ENTMCNC: 32.6 G/DL (ref 31.8–35.4)
MCV RBC: 93.1 FL (ref 81–99)
MEAN CORPUSCULAR HEMOGLOBIN: 30.3 PG (ref 27–31.2)
PLATELET # BLD: 331 K/MM3 (ref 142–424)
POTASSIUM: 4.4 MMOL/L (ref 3.5–5.1)
PROT SERPL-MCNC: 6.8 G/DL (ref 6.3–8.2)
RBC # BLD AUTO: 5.06 M/MM3 (ref 4.2–5.4)
SODIUM SPEC-SCNC: 140 MMOL/L (ref 136–145)
TRIGLYCERIDES: 135 MG/DL (ref 30–150)
TSH SERPL-ACNC: 1.13 UIU/ML (ref 0.47–4.68)
WBC # BLD AUTO: 7.6 K/MM3 (ref 4.8–10.8)

## 2023-01-03 PROCEDURE — 85025 COMPLETE CBC W/AUTO DIFF WBC: CPT

## 2023-01-03 PROCEDURE — 80053 COMPREHEN METABOLIC PANEL: CPT

## 2023-01-03 PROCEDURE — 82043 UR ALBUMIN QUANTITATIVE: CPT

## 2023-01-03 PROCEDURE — 80305 DRUG TEST PRSMV DIR OPT OBS: CPT

## 2023-01-03 PROCEDURE — 83036 HEMOGLOBIN GLYCOSYLATED A1C: CPT

## 2023-01-03 PROCEDURE — 82570 ASSAY OF URINE CREATININE: CPT

## 2023-01-03 PROCEDURE — 80061 LIPID PANEL: CPT

## 2023-01-03 PROCEDURE — 84443 ASSAY THYROID STIM HORMONE: CPT

## 2023-02-22 ENCOUNTER — HOSPITAL ENCOUNTER (OUTPATIENT)
Age: 58
End: 2023-02-22
Payer: MEDICARE

## 2023-02-22 DIAGNOSIS — R93.0: ICD-10-CM

## 2023-02-22 DIAGNOSIS — R42: ICD-10-CM

## 2023-02-22 DIAGNOSIS — R51.9: ICD-10-CM

## 2023-02-22 DIAGNOSIS — H55.00: ICD-10-CM

## 2023-02-22 DIAGNOSIS — R06.09: ICD-10-CM

## 2023-02-22 DIAGNOSIS — Z72.0: ICD-10-CM

## 2023-02-22 DIAGNOSIS — Z79.84: ICD-10-CM

## 2023-02-22 DIAGNOSIS — E11.69: Primary | ICD-10-CM

## 2023-02-22 LAB
BUN SERPL-MCNC: 18 MG/DL (ref 7–17)
CREAT BLD-SCNC: 0.6 MG/DL (ref 0.52–1.04)
CRP SERPL HS-MCNC: 1.3 MG/L (ref 0–4)
ESTIMATED GLOMERULAR FILT RATE: 103 ML/MIN (ref 60–?)
FOLATE: > 20 NG/ML
GFR (AFRICAN AMERICAN): 125 ML/MIN (ref 60–?)
VITAMIN B12: 477 PG/ML (ref 239–931)

## 2023-02-22 PROCEDURE — 82565 ASSAY OF CREATININE: CPT

## 2023-02-22 PROCEDURE — 86225 DNA ANTIBODY NATIVE: CPT

## 2023-02-22 PROCEDURE — 82607 VITAMIN B-12: CPT

## 2023-02-22 PROCEDURE — 86235 NUCLEAR ANTIGEN ANTIBODY: CPT

## 2023-02-22 PROCEDURE — 36415 COLL VENOUS BLD VENIPUNCTURE: CPT

## 2023-02-22 PROCEDURE — 94762 N-INVAS EAR/PLS OXIMTRY CONT: CPT

## 2023-02-22 PROCEDURE — 86140 C-REACTIVE PROTEIN: CPT

## 2023-02-22 PROCEDURE — 84520 ASSAY OF UREA NITROGEN: CPT

## 2023-02-22 PROCEDURE — 82746 ASSAY OF FOLIC ACID SERUM: CPT

## 2023-02-22 PROCEDURE — 85651 RBC SED RATE NONAUTOMATED: CPT

## 2023-02-22 PROCEDURE — 86038 ANTINUCLEAR ANTIBODIES: CPT

## 2023-02-22 PROCEDURE — 86593 SYPHILIS TEST NON-TREP QUANT: CPT

## 2023-02-25 LAB
ANTI-CENTROMERE B ABS CHARGE: (no result)
ANTI-DNA (DS) AB CHARGE: (no result)
ANTI-JO-1 CHARGE: (no result)
ANTICHROMATIN ABS CHARGE: (no result)
ANTISCLERODERMA-70 ABS CHARGE: (no result)
RNP ANTIBODIES CHARGE: (no result)
SJOGREN'S ANTI-SS-A AB CHARGE: (no result)
SJOGREN'S ANTI-SS-B AB CHARGE: (no result)
SMITH ANTIBODIES CHARGE: (no result)

## 2023-02-27 ENCOUNTER — HOSPITAL ENCOUNTER (OUTPATIENT)
Age: 58
End: 2023-02-27
Payer: MEDICARE

## 2023-02-27 DIAGNOSIS — Z79.84: ICD-10-CM

## 2023-02-27 DIAGNOSIS — Z72.0: ICD-10-CM

## 2023-02-27 DIAGNOSIS — R51.9: ICD-10-CM

## 2023-02-27 DIAGNOSIS — R93.0: ICD-10-CM

## 2023-02-27 DIAGNOSIS — R42: ICD-10-CM

## 2023-02-27 DIAGNOSIS — H55.00: ICD-10-CM

## 2023-02-27 DIAGNOSIS — E11.69: Primary | ICD-10-CM

## 2023-02-27 PROCEDURE — 70553 MRI BRAIN STEM W/O & W/DYE: CPT

## 2023-02-27 PROCEDURE — A9576 INJ PROHANCE MULTIPACK: HCPCS

## 2023-07-18 ENCOUNTER — OFFICE (OUTPATIENT)
Dept: URBAN - METROPOLITAN AREA CLINIC 4 | Facility: CLINIC | Age: 58
End: 2023-07-18
Payer: COMMERCIAL

## 2023-07-18 VITALS — DIASTOLIC BLOOD PRESSURE: 76 MMHG | HEIGHT: 64 IN | SYSTOLIC BLOOD PRESSURE: 118 MMHG | WEIGHT: 178 LBS

## 2023-07-18 DIAGNOSIS — K30 FUNCTIONAL DYSPEPSIA: ICD-10-CM

## 2023-07-18 DIAGNOSIS — E74.31 SUCRASE-ISOMALTASE DEFICIENCY: ICD-10-CM

## 2023-07-18 DIAGNOSIS — R19.4 CHANGE IN BOWEL HABIT: ICD-10-CM

## 2023-07-18 PROCEDURE — 99213 OFFICE O/P EST LOW 20 MIN: CPT | Performed by: NURSE PRACTITIONER

## 2023-08-07 ENCOUNTER — HOSPITAL ENCOUNTER (OUTPATIENT)
Age: 58
End: 2023-08-07
Payer: MEDICARE

## 2023-08-07 DIAGNOSIS — Z79.899: ICD-10-CM

## 2023-08-07 DIAGNOSIS — M54.16: Primary | ICD-10-CM

## 2023-08-07 PROCEDURE — 80361 OPIATES 1 OR MORE: CPT

## 2023-08-07 PROCEDURE — 80305 DRUG TEST PRSMV DIR OPT OBS: CPT

## 2023-08-07 PROCEDURE — G0480 DRUG TEST DEF 1-7 CLASSES: HCPCS

## 2023-08-07 PROCEDURE — 80365 DRUG SCREENING OXYCODONE: CPT

## 2023-08-08 ENCOUNTER — HOSPITAL ENCOUNTER (OUTPATIENT)
Age: 58
End: 2023-08-08
Payer: MEDICARE

## 2023-08-08 DIAGNOSIS — E11.42: Primary | ICD-10-CM

## 2023-08-08 DIAGNOSIS — E03.9: ICD-10-CM

## 2023-08-08 DIAGNOSIS — M25.561: ICD-10-CM

## 2023-08-08 DIAGNOSIS — E78.5: ICD-10-CM

## 2023-08-08 DIAGNOSIS — Z79.84: ICD-10-CM

## 2023-08-08 DIAGNOSIS — M17.11: ICD-10-CM

## 2023-08-08 LAB
ALBUMIN LEVEL: 4.5 G/DL (ref 3.5–5)
ALBUMIN/GLOB SERPL: 1.6 {RATIO} (ref 1.1–1.8)
ALP ISO SERPL-ACNC: 110 U/L (ref 38–126)
ALT SERPLBLD-CCNC: 21 U/L (ref 12–78)
ANION GAP SERPL CALC-SCNC: 11.8 MEQ/L (ref 5–15)
AST SERPL QL: 23 U/L (ref 14–36)
BILIRUBIN,TOTAL: 0.3 MG/DL (ref 0.2–1.3)
BUN SERPL-MCNC: 14 MG/DL (ref 7–17)
CALCIUM SPEC-MCNC: 9.6 MG/DL (ref 8.4–10.2)
CHLORIDE SPEC-SCNC: 111 MMOL/L (ref 98–107)
CHOLEST SPEC-SCNC: 250 MG/DL (ref 140–200)
CO2 SERPL-SCNC: 25 MMOL/L (ref 22–30)
CREAT BLD-SCNC: 0.7 MG/DL (ref 0.52–1.04)
ESTIMATED GLOMERULAR FILT RATE: 86 ML/MIN (ref 60–?)
GFR (AFRICAN AMERICAN): 104 ML/MIN (ref 60–?)
GLOBULIN SER CALC-MCNC: 2.9 G/DL (ref 1.3–3.2)
GLUCOSE: 93 MG/DL (ref 74–100)
HCT VFR BLD CALC: 48.8 % (ref 37–47)
HDLC SERPL-MCNC: 47 MG/DL (ref 40–60)
HGB BLD-MCNC: 15.6 G/DL (ref 12.2–16.2)
MCHC RBC-ENTMCNC: 32 G/DL (ref 31.8–35.4)
MCV RBC: 96.1 FL (ref 81–99)
MEAN CORPUSCULAR HEMOGLOBIN: 30.7 PG (ref 27–31.2)
PLATELET # BLD: 283 K/MM3 (ref 142–424)
POTASSIUM: 4.8 MMOL/L (ref 3.5–5.1)
PROT SERPL-MCNC: 7.4 G/DL (ref 6.3–8.2)
RBC # BLD AUTO: 5.07 M/MM3 (ref 4.2–5.4)
SODIUM SPEC-SCNC: 143 MMOL/L (ref 136–145)
TRIGLYCERIDES: 190 MG/DL (ref 30–150)
TSH SERPL-ACNC: 1.16 UIU/ML (ref 0.47–4.68)
WBC # BLD AUTO: 6.2 K/MM3 (ref 4.8–10.8)

## 2023-08-08 PROCEDURE — 80061 LIPID PANEL: CPT

## 2023-08-08 PROCEDURE — 36415 COLL VENOUS BLD VENIPUNCTURE: CPT

## 2023-08-08 PROCEDURE — 85025 COMPLETE CBC W/AUTO DIFF WBC: CPT

## 2023-08-08 PROCEDURE — 84443 ASSAY THYROID STIM HORMONE: CPT

## 2023-08-08 PROCEDURE — 73562 X-RAY EXAM OF KNEE 3: CPT

## 2023-08-08 PROCEDURE — 83036 HEMOGLOBIN GLYCOSYLATED A1C: CPT

## 2023-08-08 PROCEDURE — 80053 COMPREHEN METABOLIC PANEL: CPT

## 2023-08-09 LAB — HBA1C MFR BLD: 5.2 % (ref 4–6)

## 2023-11-06 ENCOUNTER — HOSPITAL ENCOUNTER (OUTPATIENT)
Age: 58
End: 2023-11-06
Payer: MEDICARE

## 2023-11-06 DIAGNOSIS — M54.16: ICD-10-CM

## 2023-11-06 DIAGNOSIS — Z79.891: Primary | ICD-10-CM

## 2023-11-06 DIAGNOSIS — M17.11: ICD-10-CM

## 2023-11-06 PROCEDURE — 80365 DRUG SCREENING OXYCODONE: CPT

## 2023-11-06 PROCEDURE — G0480 DRUG TEST DEF 1-7 CLASSES: HCPCS

## 2023-11-06 PROCEDURE — 80305 DRUG TEST PRSMV DIR OPT OBS: CPT

## 2023-11-06 PROCEDURE — 80361 OPIATES 1 OR MORE: CPT

## 2023-11-11 LAB
OXYCODONE (GC/MS): 622 NG/ML
OXYMORPHONE (GC/MS): 200 NG/ML

## 2024-02-27 ENCOUNTER — HOSPITAL ENCOUNTER (OUTPATIENT)
Age: 59
End: 2024-02-27
Payer: MEDICARE

## 2024-02-27 DIAGNOSIS — K26.9: ICD-10-CM

## 2024-02-27 DIAGNOSIS — E11.42: Primary | ICD-10-CM

## 2024-02-27 DIAGNOSIS — M17.11: ICD-10-CM

## 2024-02-27 DIAGNOSIS — E03.9: ICD-10-CM

## 2024-02-27 DIAGNOSIS — Z79.84: ICD-10-CM

## 2024-02-27 LAB
ALBUMIN LEVEL: 4.2 G/DL (ref 3.5–5)
ALBUMIN/GLOB SERPL: 1.8 {RATIO} (ref 1.1–1.8)
ALP ISO SERPL-ACNC: 93 U/L (ref 38–126)
ALT SERPLBLD-CCNC: 25 U/L (ref 12–78)
ANION GAP SERPL CALC-SCNC: 11.3 MEQ/L (ref 5–15)
AST SERPL QL: 28 U/L (ref 14–36)
BILIRUBIN,TOTAL: 0.4 MG/DL (ref 0.2–1.3)
BUN SERPL-MCNC: 12 MG/DL (ref 7–17)
CALCIUM SPEC-MCNC: 9.4 MG/DL (ref 8.4–10.2)
CHLORIDE SPEC-SCNC: 109 MMOL/L (ref 98–107)
CHOLEST SPEC-SCNC: 171 MG/DL (ref 140–200)
CO2 SERPL-SCNC: 24 MMOL/L (ref 22–30)
CREATININE,SERUM: 0.7 MG/DL (ref 0.52–1.04)
ESTIMATED GLOMERULAR FILT RATE: 86 ML/MIN (ref 60–?)
GFR (AFRICAN AMERICAN): 104 ML/MIN (ref 60–?)
GLOBULIN SER CALC-MCNC: 2.4 G/DL (ref 1.3–3.2)
GLUCOSE: 86 MG/DL (ref 74–100)
HBA1C MFR BLD: 5.3 % (ref 4–6)
HDLC SERPL-MCNC: 55 MG/DL (ref 40–60)
POTASSIUM: 4.3 MMOL/L (ref 3.5–5.1)
PROT SERPL-MCNC: 6.6 G/DL (ref 6.3–8.2)
SODIUM SPEC-SCNC: 140 MMOL/L (ref 136–145)
TRIGLYCERIDES: 122 MG/DL (ref 30–150)
TSH SERPL-ACNC: 1.01 UIU/ML (ref 0.47–4.68)

## 2024-02-27 PROCEDURE — 84443 ASSAY THYROID STIM HORMONE: CPT

## 2024-02-27 PROCEDURE — 80061 LIPID PANEL: CPT

## 2024-02-27 PROCEDURE — 82570 ASSAY OF URINE CREATININE: CPT

## 2024-02-27 PROCEDURE — 80053 COMPREHEN METABOLIC PANEL: CPT

## 2024-02-27 PROCEDURE — 83036 HEMOGLOBIN GLYCOSYLATED A1C: CPT

## 2024-02-27 PROCEDURE — 82043 UR ALBUMIN QUANTITATIVE: CPT
